# Patient Record
Sex: MALE | Race: WHITE | NOT HISPANIC OR LATINO | Employment: FULL TIME | URBAN - METROPOLITAN AREA
[De-identification: names, ages, dates, MRNs, and addresses within clinical notes are randomized per-mention and may not be internally consistent; named-entity substitution may affect disease eponyms.]

---

## 2023-12-28 ENCOUNTER — OFFICE VISIT (OUTPATIENT)
Dept: PRIMARY CARE | Facility: CLINIC | Age: 53
End: 2023-12-28
Payer: COMMERCIAL

## 2023-12-28 ENCOUNTER — LAB (OUTPATIENT)
Dept: LAB | Facility: LAB | Age: 53
End: 2023-12-28
Payer: COMMERCIAL

## 2023-12-28 VITALS
SYSTOLIC BLOOD PRESSURE: 136 MMHG | BODY MASS INDEX: 39.17 KG/M2 | DIASTOLIC BLOOD PRESSURE: 90 MMHG | HEIGHT: 75 IN | HEART RATE: 62 BPM | WEIGHT: 315 LBS

## 2023-12-28 DIAGNOSIS — Z11.4 ENCOUNTER FOR SCREENING FOR HIV: ICD-10-CM

## 2023-12-28 DIAGNOSIS — E11.9 CONTROLLED TYPE 2 DIABETES MELLITUS WITHOUT COMPLICATION, WITHOUT LONG-TERM CURRENT USE OF INSULIN (MULTI): ICD-10-CM

## 2023-12-28 DIAGNOSIS — M25.512 CHRONIC LEFT SHOULDER PAIN: ICD-10-CM

## 2023-12-28 DIAGNOSIS — Z11.59 NEED FOR HEPATITIS C SCREENING TEST: ICD-10-CM

## 2023-12-28 DIAGNOSIS — G89.29 ELBOW PAIN, CHRONIC, LEFT: ICD-10-CM

## 2023-12-28 DIAGNOSIS — Z00.00 ANNUAL PHYSICAL EXAM: Primary | ICD-10-CM

## 2023-12-28 DIAGNOSIS — Z12.5 PROSTATE CANCER SCREENING: ICD-10-CM

## 2023-12-28 DIAGNOSIS — Z00.00 ANNUAL PHYSICAL EXAM: ICD-10-CM

## 2023-12-28 DIAGNOSIS — G89.29 CHRONIC LEFT SHOULDER PAIN: ICD-10-CM

## 2023-12-28 DIAGNOSIS — E55.9 VITAMIN D DEFICIENCY: ICD-10-CM

## 2023-12-28 DIAGNOSIS — M25.522 ELBOW PAIN, CHRONIC, LEFT: ICD-10-CM

## 2023-12-28 DIAGNOSIS — R03.0 ELEVATED BLOOD-PRESSURE READING, WITHOUT DIAGNOSIS OF HYPERTENSION: ICD-10-CM

## 2023-12-28 PROBLEM — R40.0 DAYTIME SOMNOLENCE: Status: RESOLVED | Noted: 2023-12-28 | Resolved: 2023-12-28

## 2023-12-28 PROBLEM — R79.89 ELEVATED FERRITIN: Status: ACTIVE | Noted: 2023-12-28

## 2023-12-28 PROBLEM — G47.9 DIFFICULTY SLEEPING: Status: ACTIVE | Noted: 2023-12-28

## 2023-12-28 PROBLEM — L98.9 SKIN LESION: Status: ACTIVE | Noted: 2023-12-28

## 2023-12-28 PROBLEM — R05.9 COUGH, UNSPECIFIED: Status: ACTIVE | Noted: 2022-01-19

## 2023-12-28 PROBLEM — E78.5 HYPERLIPIDEMIA: Status: ACTIVE | Noted: 2023-12-28

## 2023-12-28 PROBLEM — R74.8 ELEVATED LIVER ENZYMES: Status: ACTIVE | Noted: 2022-04-15

## 2023-12-28 PROBLEM — K76.0 HEPATIC STEATOSIS: Status: ACTIVE | Noted: 2023-12-28

## 2023-12-28 PROBLEM — E66.9 OBESITY WITH BODY MASS INDEX 30 OR GREATER: Status: ACTIVE | Noted: 2023-12-28

## 2023-12-28 PROBLEM — G47.9 DIFFICULTY SLEEPING: Status: RESOLVED | Noted: 2023-12-28 | Resolved: 2023-12-28

## 2023-12-28 PROBLEM — R05.9 COUGH, UNSPECIFIED: Status: RESOLVED | Noted: 2022-01-19 | Resolved: 2023-12-28

## 2023-12-28 PROBLEM — M79.672 PAIN OF LEFT HEEL: Status: RESOLVED | Noted: 2023-12-28 | Resolved: 2023-12-28

## 2023-12-28 PROBLEM — M79.672 PAIN OF LEFT HEEL: Status: ACTIVE | Noted: 2023-12-28

## 2023-12-28 PROBLEM — L98.9 SKIN LESION: Status: RESOLVED | Noted: 2023-12-28 | Resolved: 2023-12-28

## 2023-12-28 PROBLEM — M1A.0721 IDIOPATHIC CHRONIC GOUT OF LEFT FOOT WITH TOPHUS: Status: ACTIVE | Noted: 2023-12-28

## 2023-12-28 PROBLEM — U07.1 DISEASE DUE TO SEVERE ACUTE RESPIRATORY SYNDROME CORONAVIRUS 2 (SARS-COV-2): Status: ACTIVE | Noted: 2023-12-28

## 2023-12-28 PROBLEM — R40.0 DAYTIME SOMNOLENCE: Status: ACTIVE | Noted: 2023-12-28

## 2023-12-28 LAB
25(OH)D3 SERPL-MCNC: 26 NG/ML (ref 30–100)
ALBUMIN SERPL BCP-MCNC: 4.5 G/DL (ref 3.4–5)
ALP SERPL-CCNC: 61 U/L (ref 33–120)
ALT SERPL W P-5'-P-CCNC: 81 U/L (ref 10–52)
ANION GAP SERPL CALC-SCNC: 12 MMOL/L (ref 10–20)
AST SERPL W P-5'-P-CCNC: 41 U/L (ref 9–39)
BASOPHILS # BLD AUTO: 0.02 X10*3/UL (ref 0–0.1)
BASOPHILS NFR BLD AUTO: 0.3 %
BILIRUB SERPL-MCNC: 1 MG/DL (ref 0–1.2)
BUN SERPL-MCNC: 21 MG/DL (ref 6–23)
CALCIUM SERPL-MCNC: 9.4 MG/DL (ref 8.6–10.3)
CHLORIDE SERPL-SCNC: 104 MMOL/L (ref 98–107)
CHOLEST SERPL-MCNC: 222 MG/DL (ref 0–199)
CHOLESTEROL/HDL RATIO: 6.3
CO2 SERPL-SCNC: 25 MMOL/L (ref 21–32)
CREAT SERPL-MCNC: 0.97 MG/DL (ref 0.5–1.3)
EOSINOPHIL # BLD AUTO: 0.21 X10*3/UL (ref 0–0.7)
EOSINOPHIL NFR BLD AUTO: 3.1 %
ERYTHROCYTE [DISTWIDTH] IN BLOOD BY AUTOMATED COUNT: 12.8 % (ref 11.5–14.5)
EST. AVERAGE GLUCOSE BLD GHB EST-MCNC: 137 MG/DL
GFR SERPL CREATININE-BSD FRML MDRD: >90 ML/MIN/1.73M*2
GLUCOSE SERPL-MCNC: 128 MG/DL (ref 74–99)
HBA1C MFR BLD: 6.4 %
HCT VFR BLD AUTO: 50.5 % (ref 41–52)
HCV AB SER QL: NONREACTIVE
HDLC SERPL-MCNC: 35.4 MG/DL
HGB BLD-MCNC: 16.9 G/DL (ref 13.5–17.5)
HIV 1+2 AB+HIV1 P24 AG SERPL QL IA: NONREACTIVE
IMM GRANULOCYTES # BLD AUTO: 0.02 X10*3/UL (ref 0–0.7)
IMM GRANULOCYTES NFR BLD AUTO: 0.3 % (ref 0–0.9)
LDLC SERPL CALC-MCNC: 132 MG/DL
LYMPHOCYTES # BLD AUTO: 1.76 X10*3/UL (ref 1.2–4.8)
LYMPHOCYTES NFR BLD AUTO: 25.7 %
MCH RBC QN AUTO: 29.9 PG (ref 26–34)
MCHC RBC AUTO-ENTMCNC: 33.5 G/DL (ref 32–36)
MCV RBC AUTO: 89 FL (ref 80–100)
MONOCYTES # BLD AUTO: 0.66 X10*3/UL (ref 0.1–1)
MONOCYTES NFR BLD AUTO: 9.6 %
NEUTROPHILS # BLD AUTO: 4.17 X10*3/UL (ref 1.2–7.7)
NEUTROPHILS NFR BLD AUTO: 61 %
NON HDL CHOLESTEROL: 187 MG/DL (ref 0–149)
NRBC BLD-RTO: 0 /100 WBCS (ref 0–0)
PLATELET # BLD AUTO: 216 X10*3/UL (ref 150–450)
POC APPEARANCE, URINE: CLEAR
POC BILIRUBIN, URINE: NEGATIVE
POC BLOOD, URINE: NEGATIVE
POC COLOR, URINE: YELLOW
POC GLUCOSE, URINE: NEGATIVE MG/DL
POC KETONES, URINE: NEGATIVE MG/DL
POC LEUKOCYTES, URINE: NEGATIVE
POC NITRITE,URINE: NEGATIVE
POC PH, URINE: 6 PH
POC PROTEIN, URINE: NEGATIVE MG/DL
POC SPECIFIC GRAVITY, URINE: 1.02
POC UROBILINOGEN, URINE: 0.2 EU/DL
POTASSIUM SERPL-SCNC: 4 MMOL/L (ref 3.5–5.3)
PROT SERPL-MCNC: 7.1 G/DL (ref 6.4–8.2)
PSA SERPL-MCNC: 0.13 NG/ML
RBC # BLD AUTO: 5.66 X10*6/UL (ref 4.5–5.9)
SODIUM SERPL-SCNC: 137 MMOL/L (ref 136–145)
TRIGL SERPL-MCNC: 275 MG/DL (ref 0–149)
TSH SERPL-ACNC: 3 MIU/L (ref 0.44–3.98)
VLDL: 55 MG/DL (ref 0–40)
WBC # BLD AUTO: 6.8 X10*3/UL (ref 4.4–11.3)

## 2023-12-28 PROCEDURE — 85025 COMPLETE CBC W/AUTO DIFF WBC: CPT

## 2023-12-28 PROCEDURE — 3050F LDL-C >= 130 MG/DL: CPT | Performed by: FAMILY MEDICINE

## 2023-12-28 PROCEDURE — 81002 URINALYSIS NONAUTO W/O SCOPE: CPT | Performed by: FAMILY MEDICINE

## 2023-12-28 PROCEDURE — 86803 HEPATITIS C AB TEST: CPT

## 2023-12-28 PROCEDURE — 93000 ELECTROCARDIOGRAM COMPLETE: CPT | Performed by: FAMILY MEDICINE

## 2023-12-28 PROCEDURE — 36415 COLL VENOUS BLD VENIPUNCTURE: CPT

## 2023-12-28 PROCEDURE — 87389 HIV-1 AG W/HIV-1&-2 AB AG IA: CPT

## 2023-12-28 PROCEDURE — 3075F SYST BP GE 130 - 139MM HG: CPT | Performed by: FAMILY MEDICINE

## 2023-12-28 PROCEDURE — 99396 PREV VISIT EST AGE 40-64: CPT | Performed by: FAMILY MEDICINE

## 2023-12-28 PROCEDURE — 3044F HG A1C LEVEL LT 7.0%: CPT | Performed by: FAMILY MEDICINE

## 2023-12-28 PROCEDURE — 83036 HEMOGLOBIN GLYCOSYLATED A1C: CPT

## 2023-12-28 PROCEDURE — 80061 LIPID PANEL: CPT

## 2023-12-28 PROCEDURE — 84443 ASSAY THYROID STIM HORMONE: CPT

## 2023-12-28 PROCEDURE — 84153 ASSAY OF PSA TOTAL: CPT

## 2023-12-28 PROCEDURE — 1036F TOBACCO NON-USER: CPT | Performed by: FAMILY MEDICINE

## 2023-12-28 PROCEDURE — 80053 COMPREHEN METABOLIC PANEL: CPT

## 2023-12-28 PROCEDURE — 82306 VITAMIN D 25 HYDROXY: CPT

## 2023-12-28 PROCEDURE — 3080F DIAST BP >= 90 MM HG: CPT | Performed by: FAMILY MEDICINE

## 2023-12-28 ASSESSMENT — PATIENT HEALTH QUESTIONNAIRE - PHQ9
SUM OF ALL RESPONSES TO PHQ9 QUESTIONS 1 AND 2: 0
1. LITTLE INTEREST OR PLEASURE IN DOING THINGS: NOT AT ALL
2. FEELING DOWN, DEPRESSED OR HOPELESS: NOT AT ALL

## 2023-12-28 NOTE — ASSESSMENT & PLAN NOTE
This has not been checked in a while  We will add the blood work today  Last checked in August 2019, noted to be 39, prior to that in February 2019 and was noted to be 5

## 2023-12-28 NOTE — PROGRESS NOTES
Subjective   Patient ID: Dilma Rios is a 53 y.o. male who presents for Annual Exam.    Past Medical, Surgical, and Family History reviewed and updated in chart.    Reviewed all medications by prescribing practitioner or clinical pharmacist (such as prescriptions, OTCs, herbal therapies and supplements) and documented in the medical record.    HPI  1. Physical Examination:  Dilma underwent a Cologuard test in July 2022, which returned negative results. The next test is due in July 2025. He has no record of immunizations and has expressed disinterest in pursuing any. Although, he has considered getting the shingles vaccine from a local pharmacy. He attended [employer], began college at [employer], but later transferred to [place], although he did not graduate. He currently resides in [place] with his family.    2. Diabetes:  Dilma's Hemoglobin A1c was measured at 6.5% in February 2022. Since his last visit, he has gained approximately 10 pounds. He is not currently on any medication for his diabetes.    3. Left Shoulder Pain:  Dilma believes he may have a rotator cuff problem but has not been evaluated by an orthopedic surgeon. The injury occurred a few years ago, and he has experienced shoulder issues since. He enjoys bow hunting but experiences severe pain when drawing the first arrow, which persists throughout the day. Despite this, he is uncertain about consulting an orthopedic surgeon, as he is not currently interested in surgery. He also reports some tenderness in the left elbow area, on either side of the olecranon, but without any visible swelling.    Review of Systems  All pertinent positive symptoms are included in the history of present illness.    All other systems have been reviewed and are negative and noncontributory to this patient's current ailments.    Past Medical History:   Diagnosis Date    Encounter for screening for cardiovascular disorders 08/22/2019    Screening for  "cardiovascular condition    Personal history of other endocrine, nutritional and metabolic disease 03/12/2019    History of hyperglycemia     Past Surgical History:   Procedure Laterality Date    OTHER SURGICAL HISTORY  02/07/2019    [place] tooth extraction    OTHER SURGICAL HISTORY  02/07/2019    Fracture repair     Social History     Tobacco Use    Smoking status: Never     Passive exposure: Never    Smokeless tobacco: Never   Substance Use Topics    Alcohol use: Yes    Drug use: Never     Family History   Problem Relation Name Age of Onset    Skin cancer Mother      Hyperlipidemia Father      Prostate cancer Father's Brother      Heart failure Maternal Grandmother      Pancreatic cancer Other       Immunization History   Administered Date(s) Administered    Tetanus toxoid, adsorbed 12/11/2014     No current outpatient medications  No Known Allergies    Objective   Vitals:    12/28/23 0838   BP: 136/90   Pulse: 62   Weight: (!) 152 kg (335 lb)   Height: 1.91 m (6' 3.2\")     Body mass index is 41.65 kg/m².    BP Readings from Last 3 Encounters:   12/28/23 136/90   04/15/22 134/89   02/08/22 124/78      Wt Readings from Last 3 Encounters:   12/28/23 (!) 152 kg (335 lb)   04/15/22 (!) 147 kg (325 lb)   02/08/22 (!) 149 kg (328 lb)      Physical Exam  CONSTITUTIONAL - well nourished, well developed, obese white male, looks like stated age, in no acute distress, not ill-appearing, and not tired appearing  SKIN - normal skin color and pigmentation, normal skin turgor without rash, lesions, or nodules visualized  HEAD - no trauma, normocephalic  EYES - pupils are equal and reactive to light, extraocular muscles are intact, and normal external exam  ENT - TM's intact, no injection, no signs of infection, uvula midline, normal tongue movement and throat normal, no exudate, narrow oropharynx, thick neck  NECK - supple without rigidity, no neck mass was observed, no thyromegaly or thyroid nodules  CHEST - clear to " auscultation, no wheezing, no crackles and no rales, good effort  CARDIAC - regular rate and regular rhythm, no skipped beats, no murmur  ABDOMEN - no organomegaly, soft, nontender, nondistended, normal bowel sounds, no guarding/rebound/rigidity, negative McBurney sign and negative Sanon sign  EXTREMITIES - no obvious or evident edema, no obvious or evident deformities; left shoulder with normal range of motion; left elbow without any reproducible tenderness, redness, warmth, ecchymosis or bony deformities  NEUROLOGICAL - normal gait, normal balance, normal motor, no ataxia, DTRs equal and symmetrical; alert, oriented and no focal signs  PSYCHIATRIC - alert, pleasant and cordial, age-appropriate  IMMUNOLOGIC - no cervical lymphadenopathy    Assessment/Plan   Problem List Items Addressed This Visit       Controlled diabetes mellitus (CMS/[facility])     Review of chart indicates that your hemoglobin A1c was 6.5% in February 2022  I will repeat the blood work today to ensure that it has remained stable  If it starts to climb, we need to consider medical therapy  If we need to consider something, a GLP-1 medication may be appropriate         Relevant Orders    Hemoglobin A1c (Completed)    Vitamin D deficiency     This has not been checked in a while  We will add the blood work today  Last checked in August 2019, noted to be 39, prior to that in February 2019 and was noted to be 5         Relevant Orders    Vitamin D 25-Hydroxy,Total (for eval of Vitamin D levels)    Elevated blood-pressure reading, without diagnosis of hypertension     I would like to have you monitor and record blood pressures at home   Blood pressure goal should be below 130/80, ideally 120/80  If the blood pressure is too high, we need to consider starting antihypertensive therapy         Annual physical exam - Primary     Complete history and physical examination was performed  EKG reveals sinus rhythm without acute changes  We will notify of test  results once available         Relevant Orders    Lipid Panel (Completed)    Prostate Specific Antigen (Completed)    TSH with reflex to Free T4 if abnormal (Completed)    Comprehensive Metabolic Panel (Completed)    CBC and Auto Differential (Completed)    HIV 1/2 Antigen/Antibody Screen with Reflex to Confirmation (Completed)    Hepatitis C Antibody    ECG 12 lead (Clinic Performed) (Completed)    POCT UA (nonautomated) manually resulted (Completed)    Elbow pain, chronic, left     There may be some arthritic changes in the elbow area. However, the physical examination, which was relatively unremarkable, did not reveal any significant pathology. To further evaluate this condition, an X-ray could be performed. If you decide to pursue this additional evaluation, please inform me.         Chronic left shoulder pain     Offered cortisone injection but declined  Offered orthopedic consult but declined  If change of mind on either, let me know          Other Visit Diagnoses       Prostate cancer screening        Relevant Orders    Prostate Specific Antigen (Completed)    Encounter for screening for HIV        Relevant Orders    HIV 1/2 Antigen/Antibody Screen with Reflex to Confirmation (Completed)    Need for hepatitis C screening test        Relevant Orders    Hepatitis C Antibody

## 2023-12-28 NOTE — ASSESSMENT & PLAN NOTE
There may be some arthritic changes in the elbow area. However, the physical examination, which was relatively unremarkable, did not reveal any significant pathology. To further evaluate this condition, an X-ray could be performed. If you decide to pursue this additional evaluation, please inform me.

## 2023-12-28 NOTE — ASSESSMENT & PLAN NOTE
Offered cortisone injection but declined  Offered orthopedic consult but declined  If change of mind on either, let me know

## 2023-12-28 NOTE — ASSESSMENT & PLAN NOTE
Review of chart indicates that your hemoglobin A1c was 6.5% in February 2022  I will repeat the blood work today to ensure that it has remained stable  If it starts to climb, we need to consider medical therapy  If we need to consider something, a GLP-1 medication may be appropriate

## 2023-12-29 ENCOUNTER — APPOINTMENT (OUTPATIENT)
Dept: PRIMARY CARE | Facility: CLINIC | Age: 53
End: 2023-12-29
Payer: COMMERCIAL

## 2023-12-29 DIAGNOSIS — E11.9 CONTROLLED TYPE 2 DIABETES MELLITUS WITHOUT COMPLICATION, WITHOUT LONG-TERM CURRENT USE OF INSULIN (MULTI): ICD-10-CM

## 2023-12-29 DIAGNOSIS — E55.9 VITAMIN D DEFICIENCY: ICD-10-CM

## 2023-12-29 DIAGNOSIS — E78.2 MIXED HYPERLIPIDEMIA: Primary | ICD-10-CM

## 2023-12-29 NOTE — RESULT ENCOUNTER NOTE
Your Vitamin D level has decreased from 39 to 26. I recommend you start on a prescription strength Vitamin D supplement of 50,000 IU weekly to help improve this.    Your cholesterol levels have increased since your last check. The current levels are 222, 35, 132, and 275, compared to your previous levels of 202, 35, 110, and 280.    Your glucose levels remain elevated at 128 with a Hemoglobin A1c of 6.4%, slightly down from your previous level of 6.5%. This indicates that you have diabetes. The elevated cholesterol could be contributing to the elevated liver function tests which continue to persist.    Good news is that your tests for Hepatitis C, HIV, thyroid, prostate cancer, and complete blood cell count are all normal.    Based on these data, your risk of having a heart attack over the next 10 years is calculated to be 13%. I would recommend considering a no-cost CT cardiac score, which can help us understand the plaque burden in your heart arteries. Both cholesterol-lowering medication and diabetic medication are recommended to help lower this risk. We should plan to repeat your blood work in 6 months to monitor your progress.    If you are interested in pursuing these recommendations or if you would like to discuss this further, we can set up a virtual visit. Please let us know your decision.

## 2024-01-08 ENCOUNTER — TELEMEDICINE (OUTPATIENT)
Dept: PRIMARY CARE | Facility: CLINIC | Age: 54
End: 2024-01-08
Payer: COMMERCIAL

## 2024-01-08 DIAGNOSIS — E55.9 VITAMIN D DEFICIENCY: ICD-10-CM

## 2024-01-08 DIAGNOSIS — E78.2 MIXED HYPERLIPIDEMIA: ICD-10-CM

## 2024-01-08 DIAGNOSIS — E11.9 CONTROLLED TYPE 2 DIABETES MELLITUS WITHOUT COMPLICATION, WITHOUT LONG-TERM CURRENT USE OF INSULIN (MULTI): Primary | ICD-10-CM

## 2024-01-08 PROCEDURE — 99214 OFFICE O/P EST MOD 30 MIN: CPT | Performed by: FAMILY MEDICINE

## 2024-01-08 RX ORDER — CHOLECALCIFEROL (VITAMIN D3) 1250 MCG
50000 TABLET ORAL
Qty: 12 TABLET | Refills: 3 | Status: SHIPPED | OUTPATIENT
Start: 2024-01-08 | End: 2025-01-07

## 2024-01-08 NOTE — PROGRESS NOTES
Subjective   Patient ID: Dilma Rios is a 53 y.o. male who presents for Hyperlipidemia and Diabetes.    Past Medical, Surgical, and Family History reviewed and updated in chart.    Reviewed all medications by prescribing practitioner or clinical pharmacist (such as prescriptions, OTCs, herbal therapies and supplements) and documented in the medical record.    HPI  1.  Vitamin D deficiency.  Vitamin D level has dropped from 39 to 26    2.  Hyperlipidemia.  Cholesterol levels have increased, with your current levels being 222, 35, 132, and 275, compared to your previous levels of 202, 35, 110, and 280.    3.  Diabetes.  Your glucose levels remain high at 128 with a Hemoglobin A1c of 6.4%, which is slightly lower than your previous level of 6.5%    Review of Systems  All pertinent positive symptoms are included in the history of present illness.    All other systems have been reviewed and are negative and noncontributory to this patient's current ailments.    Past Medical History:   Diagnosis Date    Encounter for screening for cardiovascular disorders 08/22/2019    Screening for cardiovascular condition    Personal history of other endocrine, nutritional and metabolic disease 03/12/2019    History of hyperglycemia     Past Surgical History:   Procedure Laterality Date    OTHER SURGICAL HISTORY  02/07/2019    [place] tooth extraction    OTHER SURGICAL HISTORY  02/07/2019    Fracture repair     Social History     Tobacco Use    Smoking status: Never     Passive exposure: Never    Smokeless tobacco: Never   Substance Use Topics    Alcohol use: Yes    Drug use: Never     Family History   Problem Relation Name Age of Onset    Skin cancer Mother      Hyperlipidemia Father      Prostate cancer Father's Brother      Heart failure Maternal Grandmother      Pancreatic cancer Other       Immunization History   Administered Date(s) Administered    Tetanus toxoid, adsorbed 12/11/2014     Current Outpatient Medications    Medication Instructions    cholecalciferol (VITAMIN D3) 50,000 Units, oral, Weekly     No Known Allergies    Objective   There were no vitals filed for this visit.  There is no height or weight on file to calculate BMI.    BP Readings from Last 3 Encounters:   12/28/23 136/90   04/15/22 134/89   02/08/22 124/78      Wt Readings from Last 3 Encounters:   12/28/23 (!) 152 kg (335 lb)   04/15/22 (!) 147 kg (325 lb)   02/08/22 (!) 149 kg (328 lb)        Lab on 12/28/2023   Component Date Value    Cholesterol 12/28/2023 222 (H)     HDL-Cholesterol 12/28/2023 35.4     Cholesterol/HDL Ratio 12/28/2023 6.3     LDL Calculated 12/28/2023 132 (H)     VLDL 12/28/2023 55 (H)     Triglycerides 12/28/2023 275 (H)     Non HDL Cholesterol 12/28/2023 187 (H)     Prostate Specific AG 12/28/2023 0.13     Thyroid Stimulating Horm* 12/28/2023 3.00     Glucose 12/28/2023 128 (H)     Sodium 12/28/2023 137     Potassium 12/28/2023 4.0     Chloride 12/28/2023 104     Bicarbonate 12/28/2023 25     Anion Gap 12/28/2023 12     Urea Nitrogen 12/28/2023 21     Creatinine 12/28/2023 0.97     eGFR 12/28/2023 >90     Calcium 12/28/2023 9.4     Albumin 12/28/2023 4.5     Alkaline Phosphatase 12/28/2023 61     Total Protein 12/28/2023 7.1     AST 12/28/2023 41 (H)     Bilirubin, Total 12/28/2023 1.0     ALT 12/28/2023 81 (H)     WBC 12/28/2023 6.8     nRBC 12/28/2023 0.0     RBC 12/28/2023 5.66     Hemoglobin 12/28/2023 16.9     Hematocrit 12/28/2023 50.5     MCV 12/28/2023 89     MCH 12/28/2023 29.9     MCHC 12/28/2023 33.5     RDW 12/28/2023 12.8     Platelets 12/28/2023 216     Neutrophils % 12/28/2023 61.0     Immature Granulocytes %,* 12/28/2023 0.3     Lymphocytes % 12/28/2023 25.7     Monocytes % 12/28/2023 9.6     Eosinophils % 12/28/2023 3.1     Basophils % 12/28/2023 0.3     Neutrophils Absolute 12/28/2023 4.17     Immature Granulocytes Ab* 12/28/2023 0.02     Lymphocytes Absolute 12/28/2023 1.76     Monocytes Absolute 12/28/2023 0.66      Eosinophils Absolute 12/28/2023 0.21     Basophils Absolute 12/28/2023 0.02     HIV 1/2 Antigen/Antibody* 12/28/2023 Nonreactive     Hepatitis C AB 12/28/2023 Nonreactive     Hemoglobin A1C 12/28/2023 6.4 (H)     Estimated Average Glucose 12/28/2023 137     Vitamin D, 25-Hydroxy, T* 12/28/2023 26 (L)    Office Visit on 12/28/2023   Component Date Value    POC Color, Urine 12/28/2023 Yellow     POC Appearance, Urine 12/28/2023 Clear     POC Glucose, Urine 12/28/2023 NEGATIVE     POC Bilirubin, Urine 12/28/2023 NEGATIVE     POC Ketones, Urine 12/28/2023 NEGATIVE     POC Specific Gravity, Ur* 12/28/2023 1.020     POC Blood, Urine 12/28/2023 NEGATIVE     POC PH, Urine 12/28/2023 6.0     POC Protein, Urine 12/28/2023 NEGATIVE     POC Urobilinogen, Urine 12/28/2023 0.2     Poc Nitrite, Urine 12/28/2023 NEGATIVE     POC Leukocytes, Urine 12/28/2023 NEGATIVE      Physical Exam  CONSTITUTIONAL - well nourished, well developed, looks like stated age, in no acute distress, not ill-appearing, and not tired appearing  SKIN - normal skin color and pigmentation  EYES - normal external exam  LUNGS - breathing comfortably, no dyspnea  EXTREMITIES - no deformities noticeable  NEUROLOGICAL - oriented and no focal signs  PSYCHIATRIC - alert, pleasant and cordial, age-appropriate, not anxious or depressed appearing    Assessment/Plan   Problem List Items Addressed This Visit       Controlled diabetes mellitus (CMS/[facility]) - Primary     The medications for diabetes to consider include Ozempic, Mounjaro, Trulicity, and possibly Wegovy, although the coverage for Wegovy may be an issue and it might be difficult to find at a pharmacy.    GLP-1 (glucagon-like peptide-1) medications, such as GLP-1 receptor agonists, offer several benefits for individuals who take them. Here are some of the potential advantages:    1. Blood sugar control: GLP-1 medications help regulate blood sugar levels by stimulating insulin release from the pancreas and  reducing the production of glucagon, a hormone that raises blood sugar. This can lead to improved glycemic control in individuals with hyperglycemia/prediabetes including those with type 2 diabetes.    2. Weight management: GLP-1 medications have been associated with weight loss or weight maintenance. They can promote a feeling of fullness, reduce appetite, and slow down gastric emptying, which may contribute to decreased calorie intake and potential weight loss.    3. Cardiovascular protection: Some GLP-1 medications have demonstrated cardiovascular benefits. They have been shown to reduce the risk of major adverse cardiovascular events (MACE) in individuals with established cardiovascular disease or high cardiovascular risk.    4. Blood pressure control: GLP-1 medications may have a positive impact on blood pressure. They can lead to modest reductions in systolic and diastolic blood pressure, which is beneficial for individuals with elevated blood pressure, hypertension, or cardiovascular disease.    5. Potential renal benefits: There is evidence suggesting that GLP-1 medications might have renal protective effects. They may reduce albuminuria (presence of protein in urine) and slow the decline of kidney function in individuals, in particular those with diabetic kidney disease.    6. Lower hypoglycemia risk: Compared to some other diabetes medications, GLP-1 medications have a lower risk of causing hypoglycemia (low blood sugar). This can be advantageous, particularly for individuals who are at higher risk of experiencing hypoglycemic episodes.    7. Potential improvement in beta-cell function: GLP-1 medications have been associated with preserving or improving pancreatic beta-cell function, which is responsible for insulin production. This can be beneficial for individuals with prediabetes and type 2 diabetes.    At this time, you are not inclined to start diabetic medication, but if you change your mind after  speaking with your wife, and even checking your insurance formulary, please notify me so that we can start this medication class is soon as possible.         Hyperlipidemia     Given these results, your risk of having a heart attack over the next 10 years is calculated to be 13%. To better understand the plaque burden in your heart arteries, I recommend scheduling a no-cost CT cardiac score. You can do this by contacting [phone].    After I have these results, we can determine whether or not statin therapy is indicated.  Based on your ASCVD risk of 13%, I would recommend taking them, especially because of your underlying history of diabetes.         Vitamin D deficiency     Please start vitamin D weekly based on your low level at 26.  I suspect that once we increase your vitamin D, your energy level should  nicely.         Relevant Medications    cholecalciferol (Vitamin D3) 1,250 mcg (50,000 unit) tablet

## 2024-01-08 NOTE — ASSESSMENT & PLAN NOTE
Please start vitamin D weekly based on your low level at 26.  I suspect that once we increase your vitamin D, your energy level should  nicely.

## 2024-01-08 NOTE — ASSESSMENT & PLAN NOTE
Given these results, your risk of having a heart attack over the next 10 years is calculated to be 13%. To better understand the plaque burden in your heart arteries, I recommend scheduling a no-cost CT cardiac score. You can do this by contacting [phone].    After I have these results, we can determine whether or not statin therapy is indicated.  Based on your ASCVD risk of 13%, I would recommend taking them, especially because of your underlying history of diabetes.

## 2024-01-08 NOTE — ASSESSMENT & PLAN NOTE
The medications for diabetes to consider include Ozempic, Mounjaro, Trulicity, and possibly Wegovy, although the coverage for Wegovy may be an issue and it might be difficult to find at a pharmacy.    GLP-1 (glucagon-like peptide-1) medications, such as GLP-1 receptor agonists, offer several benefits for individuals who take them. Here are some of the potential advantages:    1. Blood sugar control: GLP-1 medications help regulate blood sugar levels by stimulating insulin release from the pancreas and reducing the production of glucagon, a hormone that raises blood sugar. This can lead to improved glycemic control in individuals with hyperglycemia/prediabetes including those with type 2 diabetes.    2. Weight management: GLP-1 medications have been associated with weight loss or weight maintenance. They can promote a feeling of fullness, reduce appetite, and slow down gastric emptying, which may contribute to decreased calorie intake and potential weight loss.    3. Cardiovascular protection: Some GLP-1 medications have demonstrated cardiovascular benefits. They have been shown to reduce the risk of major adverse cardiovascular events (MACE) in individuals with established cardiovascular disease or high cardiovascular risk.    4. Blood pressure control: GLP-1 medications may have a positive impact on blood pressure. They can lead to modest reductions in systolic and diastolic blood pressure, which is beneficial for individuals with elevated blood pressure, hypertension, or cardiovascular disease.    5. Potential renal benefits: There is evidence suggesting that GLP-1 medications might have renal protective effects. They may reduce albuminuria (presence of protein in urine) and slow the decline of kidney function in individuals, in particular those with diabetic kidney disease.    6. Lower hypoglycemia risk: Compared to some other diabetes medications, GLP-1 medications have a lower risk of causing hypoglycemia (low  blood sugar). This can be advantageous, particularly for individuals who are at higher risk of experiencing hypoglycemic episodes.    7. Potential improvement in beta-cell function: GLP-1 medications have been associated with preserving or improving pancreatic beta-cell function, which is responsible for insulin production. This can be beneficial for individuals with prediabetes and type 2 diabetes.    At this time, you are not inclined to start diabetic medication, but if you change your mind after speaking with your wife, and even checking your insurance formulary, please notify me so that we can start this medication class is soon as possible.

## 2024-05-14 ENCOUNTER — HOSPITAL ENCOUNTER (OUTPATIENT)
Dept: RADIOLOGY | Facility: HOSPITAL | Age: 54
Discharge: HOME | End: 2024-05-14
Payer: COMMERCIAL

## 2024-05-14 DIAGNOSIS — E55.9 VITAMIN D DEFICIENCY: ICD-10-CM

## 2024-05-14 DIAGNOSIS — E11.9 CONTROLLED TYPE 2 DIABETES MELLITUS WITHOUT COMPLICATION, WITHOUT LONG-TERM CURRENT USE OF INSULIN (MULTI): ICD-10-CM

## 2024-05-14 DIAGNOSIS — E78.2 MIXED HYPERLIPIDEMIA: ICD-10-CM

## 2024-05-14 PROCEDURE — 75571 CT HRT W/O DYE W/CA TEST: CPT

## 2024-05-15 DIAGNOSIS — K76.0 HEPATIC STEATOSIS: Primary | ICD-10-CM

## 2024-05-15 NOTE — RESULT ENCOUNTER NOTE
Your CT cardiac score is 30, which indicates the presence of identifiable plaque, but it is very minimal.  However, due to the fact you have a score, and there is some plaque, and your LDL is 132 based on your last blood test, statin therapy is recommended.  I would suggest Crestor 20 mg daily with a repeat of blood work in 6 months if you are interested in pursuing.    However, the radiologist has noted diffuse fatty liver infiltration in your scan. This condition can, over time, lead to cirrhosis, which is a more serious liver condition. When this kind of finding is present, it is generally recommended to seek a consultation with a liver specialist.     A liver specialist will be able to further evaluate this finding, determine its significance, and recommend appropriate actions or treatments if necessary.  Therefore, I have placed a referral through the electronic medical record.  Please feel free to schedule an appointment at your convenience if you choose to pursue it.

## 2024-12-14 DIAGNOSIS — E55.9 VITAMIN D DEFICIENCY: ICD-10-CM

## 2024-12-16 RX ORDER — ASPIRIN 325 MG
50000 TABLET, DELAYED RELEASE (ENTERIC COATED) ORAL
Qty: 12 CAPSULE | Refills: 3 | Status: SHIPPED | OUTPATIENT
Start: 2024-12-22 | End: 2024-12-18 | Stop reason: ALTCHOICE

## 2024-12-18 DIAGNOSIS — E55.9 VITAMIN D DEFICIENCY: Primary | ICD-10-CM

## 2024-12-18 RX ORDER — ERGOCALCIFEROL 1.25 MG/1
50000 CAPSULE ORAL WEEKLY
Qty: 12 CAPSULE | Refills: 3 | Status: SHIPPED | OUTPATIENT
Start: 2024-12-18 | End: 2025-12-18

## 2025-01-24 ENCOUNTER — PATIENT MESSAGE (OUTPATIENT)
Dept: CARE COORDINATION | Facility: CLINIC | Age: 55
End: 2025-01-24
Payer: COMMERCIAL

## 2025-02-27 ENCOUNTER — APPOINTMENT (OUTPATIENT)
Dept: PRIMARY CARE | Facility: CLINIC | Age: 55
End: 2025-02-27
Payer: COMMERCIAL

## 2025-02-27 VITALS
HEART RATE: 59 BPM | WEIGHT: 315 LBS | DIASTOLIC BLOOD PRESSURE: 80 MMHG | SYSTOLIC BLOOD PRESSURE: 126 MMHG | BODY MASS INDEX: 39.17 KG/M2 | HEIGHT: 75 IN

## 2025-02-27 DIAGNOSIS — Z12.11 ENCOUNTER FOR COLORECTAL CANCER SCREENING USING COLOGUARD TEST: ICD-10-CM

## 2025-02-27 DIAGNOSIS — E11.9 CONTROLLED TYPE 2 DIABETES MELLITUS WITHOUT COMPLICATION, WITHOUT LONG-TERM CURRENT USE OF INSULIN (MULTI): ICD-10-CM

## 2025-02-27 DIAGNOSIS — Z12.12 ENCOUNTER FOR COLORECTAL CANCER SCREENING USING COLOGUARD TEST: ICD-10-CM

## 2025-02-27 DIAGNOSIS — Z00.00 ANNUAL PHYSICAL EXAM: Primary | ICD-10-CM

## 2025-02-27 DIAGNOSIS — E78.2 MIXED HYPERLIPIDEMIA: ICD-10-CM

## 2025-02-27 DIAGNOSIS — Z12.5 PROSTATE CANCER SCREENING: ICD-10-CM

## 2025-02-27 DIAGNOSIS — M77.11 RIGHT TENNIS ELBOW: ICD-10-CM

## 2025-02-27 PROBLEM — U07.1 DISEASE DUE TO SEVERE ACUTE RESPIRATORY SYNDROME CORONAVIRUS 2 (SARS-COV-2): Status: RESOLVED | Noted: 2023-12-28 | Resolved: 2025-02-27

## 2025-02-27 PROBLEM — R03.0 ELEVATED BLOOD-PRESSURE READING, WITHOUT DIAGNOSIS OF HYPERTENSION: Status: RESOLVED | Noted: 2023-12-28 | Resolved: 2025-02-27

## 2025-02-27 LAB
POC APPEARANCE, URINE: CLEAR
POC BILIRUBIN, URINE: NEGATIVE
POC BLOOD, URINE: NEGATIVE
POC COLOR, URINE: YELLOW
POC GLUCOSE, URINE: NEGATIVE MG/DL
POC KETONES, URINE: NEGATIVE MG/DL
POC LEUKOCYTES, URINE: NEGATIVE
POC NITRITE,URINE: NEGATIVE
POC PH, URINE: 6 PH
POC PROTEIN, URINE: NEGATIVE MG/DL
POC SPECIFIC GRAVITY, URINE: 1.02
POC UROBILINOGEN, URINE: 0.2 EU/DL

## 2025-02-27 PROCEDURE — 93000 ELECTROCARDIOGRAM COMPLETE: CPT | Performed by: FAMILY MEDICINE

## 2025-02-27 PROCEDURE — 1036F TOBACCO NON-USER: CPT | Performed by: FAMILY MEDICINE

## 2025-02-27 PROCEDURE — 3074F SYST BP LT 130 MM HG: CPT | Performed by: FAMILY MEDICINE

## 2025-02-27 PROCEDURE — 99396 PREV VISIT EST AGE 40-64: CPT | Performed by: FAMILY MEDICINE

## 2025-02-27 PROCEDURE — 81002 URINALYSIS NONAUTO W/O SCOPE: CPT | Performed by: FAMILY MEDICINE

## 2025-02-27 PROCEDURE — 3079F DIAST BP 80-89 MM HG: CPT | Performed by: FAMILY MEDICINE

## 2025-02-27 PROCEDURE — 3008F BODY MASS INDEX DOCD: CPT | Performed by: FAMILY MEDICINE

## 2025-02-27 ASSESSMENT — PATIENT HEALTH QUESTIONNAIRE - PHQ9
SUM OF ALL RESPONSES TO PHQ9 QUESTIONS 1 AND 2: 0
2. FEELING DOWN, DEPRESSED OR HOPELESS: NOT AT ALL
1. LITTLE INTEREST OR PLEASURE IN DOING THINGS: NOT AT ALL

## 2025-02-27 NOTE — ASSESSMENT & PLAN NOTE
Complete history and physical examination was performed  EKG reveals sinus bradycardia without acute changes  We will notify of test results once available  Vaccinations such as pneumococcal, shingles, Tdap recommended but all declined

## 2025-02-27 NOTE — ASSESSMENT & PLAN NOTE
Rest the area of concern as much as possible  Ice suggested to be used 10 to 15 minutes at least 2 to 3 times daily  Compression of the area with the tennis elbow brace as instructed in the office    If no better, we can consider a cortisone injection into the area

## 2025-02-27 NOTE — ASSESSMENT & PLAN NOTE
CT cardiac score was 30  Please obtain fasting blood work  ASCVD risk 14%  Statin therapy is recommended

## 2025-02-27 NOTE — PROGRESS NOTES
Chief Complaint  Patient ID: Dilma Rios is a 54 y.o. male who presents for Annual Exam.    Past Medical, Surgical, and Family History reviewed and updated in chart.    Reviewed all medications by prescribing practitioner or clinical pharmacist (such as prescriptions, OTCs, herbal therapies and supplements) and documented in the medical record.    History of Present Illness  1. Physical Examination:  Cologuard July 2022, negative  No interested in pursuing immunizations    2. Diabetes:  Last hemoglobin A1c 6.4% in December 2023  Not taking medication for this concern  Initial hemoglobin A1c February 2022 with 6.5%    3. Right Elbow Pain:  Dilma reports experiencing right elbow pain, primarily on the lateral aspect near the condyle where the brachioradialis inserts. He describes constant pain when attempting to lift objects with his arm fully extended or while supinating. Although the pain is tolerable, he is not interested in a cortisone injection at this time but may consider it in the future.    Dilma has consulted a chiropractor multiple times for lower back and shoulder pain. Despite ongoing weakness in the left upper extremity due to a rotator cuff tear in the left shoulder, he is currently pain-free.    Review of Systems  All pertinent positive symptoms are included in the history of present illness.    All other systems have been reviewed and are negative and noncontributory to this patient's current ailments.    Past Medical History  He has a past medical history of Encounter for screening for cardiovascular disorders (08/22/2019) and Personal history of other endocrine, nutritional and metabolic disease (03/12/2019).    Surgical History  He has a past surgical history that includes Other surgical history (02/07/2019) and Other surgical history (02/07/2019).     Social History  He reports that he has never smoked. He has never been exposed to tobacco smoke. He has never used smokeless tobacco. He  "reports current alcohol use. He reports that he does not use drugs.    Family History  He indicated that the status of his mother is unknown. He indicated that the status of his father is unknown. He indicated that the status of his maternal grandmother is unknown. He indicated that the status of his father's brother is unknown. He indicated that the status of his other is unknown.    Outpatient Medications Prior to Visit   Medication Sig Dispense Refill    ergocalciferol (Vitamin D-2) 1.25 MG (35491 UT) capsule Take 1 capsule (50,000 Units) by mouth 1 (one) time per week. 12 capsule 3     No facility-administered medications prior to visit.     Allergies  Patient has no known allergies.    Immunization History   Administered Date(s) Administered    Tetanus toxoid, adsorbed 12/11/2014     Objective   Visit Vitals  /80   Pulse 59   Ht 1.91 m (6' 3.2\")   Wt 148 kg (327 lb)   BMI 40.66 kg/m²   Smoking Status Never   BSA 2.8 m²        BP Readings from Last 3 Encounters:   02/27/25 126/80   12/28/23 136/90   04/15/22 134/89      Wt Readings from Last 3 Encounters:   02/27/25 148 kg (327 lb)   12/28/23 (!) 152 kg (335 lb)   04/15/22 (!) 147 kg (325 lb)      Vision  No results found.    Relevant Results  Office Visit on 02/27/2025   Component Date Value    POC Color, Urine 02/27/2025 Yellow     POC Appearance, Urine 02/27/2025 Clear     POC Glucose, Urine 02/27/2025 NEGATIVE     POC Bilirubin, Urine 02/27/2025 NEGATIVE     POC Ketones, Urine 02/27/2025 NEGATIVE     POC Specific Gravity, Ur* 02/27/2025 1.020     POC Blood, Urine 02/27/2025 NEGATIVE     POC PH, Urine 02/27/2025 6.0     POC Protein, Urine 02/27/2025 NEGATIVE     POC Urobilinogen, Urine 02/27/2025 0.2     Poc Nitrite, Urine 02/27/2025 NEGATIVE     POC Leukocytes, Urine 02/27/2025 NEGATIVE      The 10-year ASCVD risk score (Mery ALCALA, et al., 2019) is: 12.3%    Values used to calculate the score:      Age: 54 years      Sex: Male      Is Non- " : No      Diabetic: Yes      Tobacco smoker: No      Systolic Blood Pressure: 126 mmHg      Is BP treated: No      HDL Cholesterol: 35.4 mg/dL      Total Cholesterol: 222 mg/dL    Physical Exam  CONSTITUTIONAL - well nourished, well developed, looks like stated age, in no acute distress, not ill-appearing, and not tired appearing  SKIN - normal skin color and pigmentation, normal skin turgor without rash, lesions, or nodules visualized  HEAD - no trauma, normocephalic  EYES - pupils are equal and reactive to light, extraocular muscles are intact, and normal external exam  ENT - TM's intact, no injection, no signs of infection, uvula midline, normal tongue movement and throat normal, no exudate  NECK - supple without rigidity, no neck mass was observed, no thyromegaly or thyroid nodules  CHEST - clear to auscultation, no wheezing, no crackles and no rales, good effort  CARDIAC - bradycardic rate and regular rhythm, no skipped beats, no murmur  ABDOMEN - no organomegaly, soft, nontender, nondistended, normal bowel sounds, no guarding/rebound/rigidity, negative McBurney sign and negative Sanon sign  EXTREMITIES - no obvious or evident edema, no obvious or evident deformities; right elbow with lateral epicondyle tenderness, but no warmth, redness, swelling, no bony deformities  NEUROLOGICAL - normal gait, normal balance, normal motor, no ataxia, DTRs equal and symmetrical 1/4 both knees; alert, oriented and no focal signs  PSYCHIATRIC - alert, pleasant and cordial, age-appropriate  IMMUNOLOGIC - no cervical lymphadenopathy    Assessment and Plan  Problem List Items Addressed This Visit       Controlled diabetes mellitus (Multi)     We will obtain another hemoglobin A1c to ensure stability  We will notify of test results once available         Relevant Orders    Hemoglobin A1C    Albumin-Creatinine Ratio, Urine Random    Hyperlipidemia     CT cardiac score was 30  Please obtain fasting blood work  ASCVD  risk 14%  Statin therapy is recommended         Annual physical exam - Primary     Complete history and physical examination was performed  EKG reveals sinus bradycardia without acute changes  We will notify of test results once available  Vaccinations such as pneumococcal, shingles, Tdap recommended but all declined         Relevant Orders    Prostate Specific Antigen    Lipid Panel    TSH with reflex to Free T4 if abnormal    Comprehensive Metabolic Panel    CBC and Auto Differential    POCT UA (nonautomated) manually resulted (Completed)    ECG 12 Lead (Completed)    Right tennis elbow     Rest the area of concern as much as possible  Ice suggested to be used 10 to 15 minutes at least 2 to 3 times daily  Compression of the area with the tennis elbow brace as instructed in the office    If no better, we can consider a cortisone injection into the area          Other Visit Diagnoses       Prostate cancer screening        Relevant Orders    Prostate Specific Antigen    Encounter for colorectal cancer screening using Cologuard test        This will be due in July 2025, so I have postdated the requisition    Relevant Orders    Cologuard® colon cancer screening

## 2025-02-27 NOTE — ASSESSMENT & PLAN NOTE
We will obtain another hemoglobin A1c to ensure stability  We will notify of test results once available

## 2025-02-28 LAB
ALBUMIN SERPL-MCNC: 4.5 G/DL (ref 3.6–5.1)
ALBUMIN/CREAT UR: 14 MG/G CREAT
ALP SERPL-CCNC: 62 U/L (ref 35–144)
ALT SERPL-CCNC: 57 U/L (ref 9–46)
ANION GAP SERPL CALCULATED.4IONS-SCNC: 11 MMOL/L (CALC) (ref 7–17)
AST SERPL-CCNC: 28 U/L (ref 10–35)
BASOPHILS # BLD AUTO: 33 CELLS/UL (ref 0–200)
BASOPHILS NFR BLD AUTO: 0.5 %
BILIRUB SERPL-MCNC: 0.8 MG/DL (ref 0.2–1.2)
BUN SERPL-MCNC: 22 MG/DL (ref 7–25)
CALCIUM SERPL-MCNC: 9.5 MG/DL (ref 8.6–10.3)
CHLORIDE SERPL-SCNC: 101 MMOL/L (ref 98–110)
CHOLEST SERPL-MCNC: 212 MG/DL
CHOLEST/HDLC SERPL: 6.4 (CALC)
CO2 SERPL-SCNC: 28 MMOL/L (ref 20–32)
CREAT SERPL-MCNC: 1.05 MG/DL (ref 0.7–1.3)
CREAT UR-MCNC: 163 MG/DL (ref 20–320)
EGFRCR SERPLBLD CKD-EPI 2021: 85 ML/MIN/1.73M2
EOSINOPHIL # BLD AUTO: 152 CELLS/UL (ref 15–500)
EOSINOPHIL NFR BLD AUTO: 2.3 %
ERYTHROCYTE [DISTWIDTH] IN BLOOD BY AUTOMATED COUNT: 13 % (ref 11–15)
EST. AVERAGE GLUCOSE BLD GHB EST-MCNC: 137 MG/DL
EST. AVERAGE GLUCOSE BLD GHB EST-SCNC: 7.6 MMOL/L
GLUCOSE SERPL-MCNC: 115 MG/DL (ref 65–99)
HBA1C MFR BLD: 6.4 % OF TOTAL HGB
HCT VFR BLD AUTO: 52.1 % (ref 38.5–50)
HDLC SERPL-MCNC: 33 MG/DL
HGB BLD-MCNC: 17.3 G/DL (ref 13.2–17.1)
LDLC SERPL CALC-MCNC: 139 MG/DL (CALC)
LYMPHOCYTES # BLD AUTO: 1439 CELLS/UL (ref 850–3900)
LYMPHOCYTES NFR BLD AUTO: 21.8 %
MCH RBC QN AUTO: 29.7 PG (ref 27–33)
MCHC RBC AUTO-ENTMCNC: 33.2 G/DL (ref 32–36)
MCV RBC AUTO: 89.4 FL (ref 80–100)
MICROALBUMIN UR-MCNC: 2.3 MG/DL
MONOCYTES # BLD AUTO: 561 CELLS/UL (ref 200–950)
MONOCYTES NFR BLD AUTO: 8.5 %
NEUTROPHILS # BLD AUTO: 4415 CELLS/UL (ref 1500–7800)
NEUTROPHILS NFR BLD AUTO: 66.9 %
NONHDLC SERPL-MCNC: 179 MG/DL (CALC)
PLATELET # BLD AUTO: 214 THOUSAND/UL (ref 140–400)
PMV BLD REES-ECKER: 10.4 FL (ref 7.5–12.5)
POTASSIUM SERPL-SCNC: 4.4 MMOL/L (ref 3.5–5.3)
PROT SERPL-MCNC: 7.2 G/DL (ref 6.1–8.1)
PSA SERPL-MCNC: 0.29 NG/ML
RBC # BLD AUTO: 5.83 MILLION/UL (ref 4.2–5.8)
SODIUM SERPL-SCNC: 140 MMOL/L (ref 135–146)
TRIGL SERPL-MCNC: 247 MG/DL
TSH SERPL-ACNC: 2.23 MIU/L (ref 0.4–4.5)
WBC # BLD AUTO: 6.6 THOUSAND/UL (ref 3.8–10.8)

## 2025-03-01 NOTE — RESULT ENCOUNTER NOTE
Cholesterol 212, 33, 139, 247 previously 222, 35, 132, 275.  Your heart attack risk over 10 years is over 12%, your CT cardiac score was 30 a year ago, so I will still continue to recommend statin therapy  Electrolytes, kidney, prostate cancer screening test and thyroid normal  ALT which is a liver function test is slightly elevated at 57, previously 81 so there has been improvement  Complete blood cell count shows hemoglobin and hematocrit elevated at 17.3/52.1 previously 16.9/50.5 so we will continue to monitor as this could be an indicator of thickened blood secondary to dehydration  Hemoglobin A1c 6.4%, previously 6.4, 6.5 so your diabetes is stable, although I still would recommend the use of medication to help lower blood glucose  Urine for albumin and creatinine normal

## 2025-07-29 LAB — NONINV COLON CA DNA+OCC BLD SCRN STL QL: NEGATIVE
